# Patient Record
Sex: MALE | Race: WHITE | Employment: FULL TIME | URBAN - METROPOLITAN AREA
[De-identification: names, ages, dates, MRNs, and addresses within clinical notes are randomized per-mention and may not be internally consistent; named-entity substitution may affect disease eponyms.]

---

## 2019-04-08 ENCOUNTER — LAB ENCOUNTER (OUTPATIENT)
Dept: LAB | Age: 49
End: 2019-04-08
Attending: FAMILY MEDICINE
Payer: COMMERCIAL

## 2019-04-08 ENCOUNTER — OFFICE VISIT (OUTPATIENT)
Dept: FAMILY MEDICINE CLINIC | Facility: CLINIC | Age: 49
End: 2019-04-08
Payer: COMMERCIAL

## 2019-04-08 VITALS
HEART RATE: 49 BPM | SYSTOLIC BLOOD PRESSURE: 125 MMHG | BODY MASS INDEX: 23.65 KG/M2 | DIASTOLIC BLOOD PRESSURE: 77 MMHG | WEIGHT: 147.19 LBS | HEIGHT: 66 IN

## 2019-04-08 DIAGNOSIS — Z00.00 ROUTINE MEDICAL EXAM: Primary | ICD-10-CM

## 2019-04-08 DIAGNOSIS — Z00.00 ROUTINE MEDICAL EXAM: ICD-10-CM

## 2019-04-08 DIAGNOSIS — Z00.00 ROUTINE GENERAL MEDICAL EXAMINATION AT A HEALTH CARE FACILITY: Primary | ICD-10-CM

## 2019-04-08 PROCEDURE — 84443 ASSAY THYROID STIM HORMONE: CPT

## 2019-04-08 PROCEDURE — 82306 VITAMIN D 25 HYDROXY: CPT

## 2019-04-08 PROCEDURE — 93005 ELECTROCARDIOGRAM TRACING: CPT

## 2019-04-08 PROCEDURE — 99396 PREV VISIT EST AGE 40-64: CPT | Performed by: FAMILY MEDICINE

## 2019-04-08 PROCEDURE — 36415 COLL VENOUS BLD VENIPUNCTURE: CPT

## 2019-04-08 PROCEDURE — 85025 COMPLETE CBC W/AUTO DIFF WBC: CPT

## 2019-04-08 PROCEDURE — 80061 LIPID PANEL: CPT

## 2019-04-08 PROCEDURE — 80053 COMPREHEN METABOLIC PANEL: CPT

## 2019-04-08 PROCEDURE — 82607 VITAMIN B-12: CPT

## 2019-04-08 PROCEDURE — 93010 ELECTROCARDIOGRAM REPORT: CPT | Performed by: FAMILY MEDICINE

## 2019-04-08 NOTE — PROGRESS NOTES
Chuck Nguyễn is a 53year old male who presents for a complete physical exam.   HPI:   Reports is a runner - reports has always been. Feels good. Has good energy. Reports receiving Td within 10 years.     Wt Readings from Last 3 Encounters:  04/08/19 : 147 masses, HSM or tenderness  MUSCULOSKELETAL: back is not tender,FROM of the back  EXTREMITIES: no cyanosis, clubbing or edema  NEURO: Oriented times three,cranial nerves are intact,motor and sensory are grossly intact    ASSESSMENT AND PLAN:   Jose R Jones is

## 2019-04-10 ENCOUNTER — TELEPHONE (OUTPATIENT)
Dept: FAMILY MEDICINE CLINIC | Facility: CLINIC | Age: 49
End: 2019-04-10

## 2019-04-10 NOTE — PROGRESS NOTES
Tests are all normal. Labs look great. Normal glucose, kidney and liver function.  Normal cholesterol

## 2019-04-10 NOTE — TELEPHONE ENCOUNTER
----- Message from Sonido Antoine MD sent at 4/10/2019  9:11 AM CDT -----  Tests are all normal. Labs look great. Normal glucose, kidney and liver function.  Normal cholesterol

## 2019-04-11 NOTE — TELEPHONE ENCOUNTER
Pt requesting results for ekg    Also- pt states he is requesting a copy of blood results    He will be coming to  results today

## 2019-04-11 NOTE — TELEPHONE ENCOUNTER
Pt would like a call back with his blood test results. Please, call pt at [phone]. Pt is requesting a call back after 1:30 due to him being at work.

## 2019-04-15 NOTE — TELEPHONE ENCOUNTER
Patient notified of lab results. Patient verbalizes understanding of Dr. Moreno Osorio instructions.

## 2019-04-15 NOTE — TELEPHONE ENCOUNTER
Result Notes for EKG 12-LEAD     Notes recorded by Ever Farmer on 4/13/2019 at 10:56 AM CDT  Lab letter mailed to patient  ------    Notes recorded by Sebastian Loving MD on 4/11/2019 at 4:24 PM CDT  EKG normal other than the bradycardia which we dis

## 2020-10-02 NOTE — LETTER
5/3/2024          To Whom It May Concern:    Jak Duggan is currently under my medical care and may return to work at this time.    Please excuse Jak for 1 days.  He may return to work on Monday 5/6/24.  Activity is restricted as follows: light duty and no lifting over 20 lbs..    If you require additional information please contact our office.        Sincerely,    JOSSE Verma          Document generated by:  JOSSE Verma      2 seconds or less

## 2022-01-01 NOTE — LETTER
5/23/2024          To Whom It May Concern:    Jak Zhou is currently under my care and he may return to work  May 28th light duty:  no lifting over 15 lbs and no strenuous activity.  He may return to regular duty effective July 3     If you require additional information please contact our office.        Sincerely,         Torey Haas MD          Document generated by:  NW     Statement Selected

## 2022-09-03 PROBLEM — J30.1 SEASONAL ALLERGIC RHINITIS DUE TO POLLEN: Status: ACTIVE | Noted: 2022-09-03

## 2022-09-03 PROBLEM — Z23 IMMUNIZATION DUE: Status: ACTIVE | Noted: 2022-09-03

## 2022-09-03 PROBLEM — I83.90 VARICOSE VEINS: Status: ACTIVE | Noted: 2022-09-03

## 2022-09-03 PROBLEM — Z12.11 SCREENING FOR COLON CANCER: Status: ACTIVE | Noted: 2022-09-03

## 2022-09-03 PROBLEM — Z00.00 WELL ADULT EXAM: Status: ACTIVE | Noted: 2022-09-03

## 2022-09-03 PROBLEM — Z12.5 SCREENING FOR MALIGNANT NEOPLASM OF PROSTATE: Status: ACTIVE | Noted: 2022-09-03

## 2022-09-03 NOTE — ASSESSMENT & PLAN NOTE
Screening labs  Please aim to eat a diet high in fresh fruits and vegetables, lean protein sources, complex carbohydrates and limited processed and fast foods. Try to get at least 150 minutes of exercise per week-a combination of weight resistance and cardio is preferred.     Shingles vaccine today  Fit testing ordered  psa  Has covid vaccines x3

## 2022-09-03 NOTE — ASSESSMENT & PLAN NOTE
Pt will look to see if he can find the imaging that was done at [address]  If unable to locate imaging, ultrasound bilat lower legs  Refer vascular sx

## 2023-06-24 PROBLEM — R14.0 ABDOMINAL BLOATING: Status: ACTIVE | Noted: 2023-06-24

## 2023-06-24 PROBLEM — R68.81 EARLY SATIETY: Status: ACTIVE | Noted: 2023-06-24

## 2024-05-03 ENCOUNTER — OFFICE VISIT (OUTPATIENT)
Dept: FAMILY MEDICINE CLINIC | Facility: CLINIC | Age: 54
End: 2024-05-03

## 2024-05-03 VITALS
BODY MASS INDEX: 24.61 KG/M2 | HEART RATE: 64 BPM | WEIGHT: 156.81 LBS | TEMPERATURE: 98 F | OXYGEN SATURATION: 97 % | DIASTOLIC BLOOD PRESSURE: 66 MMHG | SYSTOLIC BLOOD PRESSURE: 115 MMHG | HEIGHT: 67 IN

## 2024-05-03 DIAGNOSIS — R19.09 INGUINAL BULGE: Primary | ICD-10-CM

## 2024-05-03 DIAGNOSIS — R10.32 LEFT GROIN PAIN: ICD-10-CM

## 2024-05-03 PROCEDURE — 99213 OFFICE O/P EST LOW 20 MIN: CPT

## 2024-05-03 NOTE — ASSESSMENT & PLAN NOTE
Resolved after patient self reduced per description of the event.  Red flags reviewed.  Inguinal US ordered.

## 2024-05-03 NOTE — ASSESSMENT & PLAN NOTE
Reported pain and bulge with sit ups.  Unable to appreciate on exam.  Patient works in warehouse with heavy lifting.   Placed on light duty, work note provided.  Pt to obtain groin US and eval for hernia. Will remove restriction if negative or refer to gen surgery if positive.   Provided with education  Reviewed red flags.  Follow up for physical

## 2024-05-03 NOTE — PROGRESS NOTES
Subjective:   Jak Duggan is a 53 year old male who presents for Stomach Pain (Possible hernia, I was doing sit ups in the gym and it hurted a lil and noticed the bump. I feel a bump left side abdominal on the crest side, past 3 days noticed it)   Patient is a pleasant 53-year-old male with past medical history consistent for allergic rhinitis and varicose veins.  Patient presents to office today for evaluation of pain in stomach, possible hernia.  Patient states he was at home doing sit ups on Tuesday, he does this on a regular basis. When he was doing them he noticed a bulge in left lower abdomen with a mild pain.Pain comes and goes, better when he pushes. He is very active and wants to see what this is. His job is physical, works in warehouse with labor and heavy boxes.           History reviewed. No pertinent past medical history.   History reviewed. No pertinent surgical history.     History/Other:    Chief Complaint Reviewed and Verified  Nursing Notes Reviewed and   Verified  Tobacco Reviewed  Allergies Reviewed  Medications Reviewed    Problem List Reviewed  Medical History Reviewed  Surgical History   Reviewed  Family History Reviewed  Social History Reviewed         Tobacco:  He has never smoked tobacco.    Current Outpatient Medications   Medication Sig Dispense Refill    pantoprazole 40 MG Oral Tab EC Take 1 tablet (40 mg total) by mouth every morning before breakfast. To help with stomach acid and stomach irritation/inflammation (Patient not taking: Reported on 5/3/2024) 90 tablet 1         Review of Systems:  Review of Systems   Constitutional: Negative.  Negative for activity change, chills and fever.   HENT: Negative.  Negative for congestion, ear pain, postnasal drip, sinus pain, sore throat and trouble swallowing.    Respiratory: Negative.  Negative for cough, shortness of breath and wheezing.    Cardiovascular: Negative.  Negative for chest pain and leg swelling.    Gastrointestinal:  Positive for abdominal pain. Negative for blood in stool, constipation and diarrhea.   Endocrine: Negative.    Genitourinary: Negative.  Negative for difficulty urinating, dysuria and flank pain.   Musculoskeletal: Negative.  Negative for arthralgias, back pain and neck stiffness.   Skin: Negative.  Negative for color change and rash.   Neurological: Negative.  Negative for dizziness and headaches.   Hematological:  Negative for adenopathy.         Objective:   /66 (BP Location: Left arm, Patient Position: Sitting, Cuff Size: adult)   Pulse 64   Temp 97.9 °F (36.6 °C)   Ht 5' 7\" (1.702 m)   Wt 156 lb 12.8 oz (71.1 kg)   SpO2 97%   BMI 24.56 kg/m²  Estimated body mass index is 24.56 kg/m² as calculated from the following:    Height as of this encounter: 5' 7\" (1.702 m).    Weight as of this encounter: 156 lb 12.8 oz (71.1 kg).  Physical Exam  Vitals and nursing note reviewed.   Constitutional:       Appearance: Normal appearance.   HENT:      Head: Normocephalic and atraumatic.      Right Ear: External ear normal.      Left Ear: External ear normal.   Cardiovascular:      Rate and Rhythm: Normal rate and regular rhythm.      Pulses: Normal pulses.      Heart sounds: Normal heart sounds.   Pulmonary:      Breath sounds: Normal breath sounds.   Abdominal:      General: Abdomen is flat. Bowel sounds are normal. There is no distension.      Palpations: Abdomen is soft. There is no mass.      Tenderness: There is no abdominal tenderness. There is no right CVA tenderness, left CVA tenderness, guarding or rebound.      Hernia: No hernia is present.      Comments: Reported bulge left inguinal hernia.  Unable to elicit bulge with increase intraabdominal pressure ( cough, sitting up)   Musculoskeletal:         General: No swelling or tenderness. Normal range of motion.   Skin:     General: Skin is warm and dry.   Neurological:      Mental Status: He is alert.           Assessment & Plan:   1.  Inguinal bulge (Primary)  Assessment & Plan:  Reported pain and bulge with sit ups.  Unable to appreciate on exam.  Patient works in warehouse with heavy lifting.   Placed on light duty, work note provided.  Pt to obtain groin US and eval for hernia. Will remove restriction if negative or refer to gen surgery if positive.   Provided with education  Reviewed red flags.  Follow up for physical   Orders:  -     US GROIN LEFT LIMITED SH(CPT=76882); Future; Expected date: 05/03/2024  2. Left groin pain  Assessment & Plan:  Resolved after patient self reduced per description of the event.  Red flags reviewed.  Inguinal US ordered.   Orders:  -     US GROIN LEFT LIMITED SH(CPT=76882); Future; Expected date: 05/03/2024        Return if symptoms worsen or fail to improve, for Annual physical.    JOSSE Verma, 5/3/2024, 8:31 AM

## 2024-05-06 ENCOUNTER — TELEPHONE (OUTPATIENT)
Dept: FAMILY MEDICINE CLINIC | Facility: CLINIC | Age: 54
End: 2024-05-06

## 2024-05-06 ENCOUNTER — HOSPITAL ENCOUNTER (OUTPATIENT)
Dept: ULTRASOUND IMAGING | Facility: HOSPITAL | Age: 54
Discharge: HOME | End: 2024-05-06
Payer: COMMERCIAL

## 2024-05-06 DIAGNOSIS — K40.90 NON-RECURRENT UNILATERAL INGUINAL HERNIA WITHOUT OBSTRUCTION OR GANGRENE: Primary | ICD-10-CM

## 2024-05-06 DIAGNOSIS — R19.09 INGUINAL BULGE: ICD-10-CM

## 2024-05-06 DIAGNOSIS — R10.32 LEFT GROIN PAIN: ICD-10-CM

## 2024-05-06 PROCEDURE — 76882 US LMTD JT/FCL EVL NVASC XTR: CPT

## 2024-05-06 NOTE — TELEPHONE ENCOUNTER
Note is waiting at front of office. It says no lifting greater than 20 lbs as it previously did. MA spoke with him earlier about referral to general surgery. thanks  
Patient called and is asking if he can get a return to work note to go back tomorrow, and he also wants to know what are his next steps for his hernia, he had his ultrasound done today, please advise.   For the back to work note patient can  at the office.   
Patient calling to state letter should state can return to work for light duty.   
Please advs Delio.   
Pt was contacted about this earlier.    Note at  with restrictions.    Pt made appt with Dr. Valdez  
Unknown if ever smoked

## 2024-05-06 NOTE — PROGRESS NOTES
Contacted pt and advised him of result and recommendations.    Provided pt with phone number for Dr. Valdez.    Pt asking for a letter to return to work tomorrow since he missed work due to his US today.    Please advise.

## 2024-05-06 NOTE — PROGRESS NOTES
1. Non-recurrent unilateral inguinal hernia without obstruction or gangrene  Paitent recently seen in office with bulge in groin. Sent for US, US reveals\" There is a direct left inguinal hernia measuring 4.9 x 1.3 x 2.2 cm.  The hernia measures 1.4 cm at its neck.  A loop of bowel is seen protruding into the neck of the hernia upon Valsalva maneuver.  The hernia changes size with and without Valsalva   maneuver and manual compression without evidence of incarceration.   Referral to gen surgery.  - Surgery Referral - In Network    Delio Elizalde, APRN

## 2024-05-14 ENCOUNTER — OFFICE VISIT (OUTPATIENT)
Dept: SURGERY | Facility: CLINIC | Age: 54
End: 2024-05-14

## 2024-05-14 VITALS — HEIGHT: 67 IN | BODY MASS INDEX: 24.48 KG/M2 | WEIGHT: 156 LBS

## 2024-05-14 DIAGNOSIS — K40.90 NON-RECURRENT UNILATERAL INGUINAL HERNIA WITHOUT OBSTRUCTION OR GANGRENE: Primary | ICD-10-CM

## 2024-05-14 PROCEDURE — 99204 OFFICE O/P NEW MOD 45 MIN: CPT | Performed by: SURGERY

## 2024-05-15 NOTE — H&P
HPI:    Patient ID: Jak Zhou is a 53 year old male presenting with   Chief Complaint   Patient presents with    Consult     Hernia, denies any pain    .    Consult        History reviewed. No pertinent past medical history.  History reviewed. No pertinent surgical history.  Family History   Problem Relation Age of Onset    Breast Cancer Mother     Diabetes Paternal Grandmother     Breast Cancer Paternal Grandmother     Diabetes Paternal Grandfather      Social History     Socioeconomic History    Marital status:      Spouse name: Not on file    Number of children: Not on file    Years of education: Not on file    Highest education level: Not on file   Occupational History    Not on file   Tobacco Use    Smoking status: Never     Passive exposure: Never    Smokeless tobacco: Never   Vaping Use    Vaping status: Never Used   Substance and Sexual Activity    Alcohol use: Never    Drug use: Never    Sexual activity: Not on file   Other Topics Concern    Not on file   Social History Narrative    Not on file     Social Determinants of Health     Financial Resource Strain: Not on file   Food Insecurity: Not on file   Transportation Needs: Not on file   Physical Activity: Not on file   Stress: Not on file   Social Connections: Not on file   Housing Stability: Not on file       Review of Systems   Constitutional: Negative.    HENT: Negative.     Eyes: Negative.    Respiratory: Negative.     Cardiovascular: Negative.    Gastrointestinal: Negative.         Left groin bulge and discomfort   Endocrine: Negative.    Genitourinary: Negative.    Musculoskeletal: Negative.    Skin: Negative.    Allergic/Immunologic: Negative.    Neurological: Negative.    Hematological:  Does not bruise/bleed easily.   Psychiatric/Behavioral: Negative.             Current Outpatient Medications   Medication Sig Dispense Refill    pantoprazole 40 MG Oral Tab EC Take 1 tablet (40 mg total) by mouth every morning before breakfast. To help with  stomach acid and stomach irritation/inflammation (Patient not taking: Reported on 5/3/2024) 90 tablet 1       Allergies:No Known Allergies   PHYSICAL EXAM:   Ht 5' 7\" (1.702 m)   Wt 156 lb (70.8 kg)   BMI 24.43 kg/m²   Physical Exam  Vitals reviewed.   Constitutional:       Appearance: Normal appearance. He is well-developed.   HENT:      Head: Normocephalic and atraumatic.   Cardiovascular:      Rate and Rhythm: Normal rate and regular rhythm.   Pulmonary:      Effort: Pulmonary effort is normal.      Breath sounds: Normal breath sounds.   Abdominal:      General: There is no distension.      Palpations: Abdomen is soft. There is no mass.      Tenderness: There is no abdominal tenderness. There is no guarding or rebound.      Hernia: A hernia is present.          Comments: Small reducible   Musculoskeletal:         General: Normal range of motion.      Cervical back: Normal range of motion and neck supple.   Skin:     General: Skin is warm and dry.   Neurological:      Mental Status: He is alert and oriented to person, place, and time.   Psychiatric:         Speech: Speech normal.         Behavior: Behavior normal.                 ASSESSMENT/PLAN:   Diagnoses and all orders for this visit:    Non-recurrent unilateral inguinal hernia without obstruction or gangrene    Other orders  -     Surgical Case Request; Future      Plan for an open LIH repair with mesh.  LMA ok.       Torey Haas MD  5/15/2024

## 2024-05-20 ENCOUNTER — TELEPHONE (OUTPATIENT)
Dept: SURGERY | Facility: CLINIC | Age: 54
End: 2024-05-20

## 2024-05-20 NOTE — TELEPHONE ENCOUNTER
Patient's daughter returning missed call about information for patient's 5/22 surgery. Please call.

## 2024-05-20 NOTE — DISCHARGE INSTRUCTIONS
Home Care Instructions    Open ventral/umbilical/inguinal/incisional hernia repair      1. You have an incision with absorbable sutures underneath the skin so no suture removal are needed.     2. You can shower the day after surgery.  The incision can get wet with water and soap.  Just pat your incision dry after showering.  Avoid soaking in a bath tub for one week.  Avoid heavy lifting (greater than 10 lbs or anything heavier than a gallon of milk) for six weeks after surgery.    3. Be up and around when you are home.  The more you walk, the faster your recovery will be.    4. You may have an abdominal binder (medical girdle).  Wear it when you are up and moving around.  The bottom of the binder should cover a little of your hip bones to provide support to your lower abdomen.  Avoid wearing the binder too high as it may make your discomfort worse.    5. Take pain medications around the clock for the first few days after surgery regardless if you have pain or not.  The pain medications take about 30 minutes to work so if you wait until you experience pain, then you might be uncomfortable during those 30 minutes.    6.  A well known side effect of pain medication is constipation.  It is the number 1, 2, and 3 reason why patients call me after surgery.  Adequate hydration and stool softeners are ways to minimize the risk of constipation after surgery.  Drink a lot of fluid when you are at home.  Check your urine color.  If it's dark, then you are dehydrated and need to drink more water.  Take as many stool softeners (morning, noon, evening) as you need to have about one bowel movement a day.  Don't let four or five days go by without a bowel movement.  If that occurs, then you might need a rectal suppository or an enema to treat the constipation.    7.  You may drive when you are no longer taking prescription pain medications with narcotics.  If your degree of discomfort is minimal, extra strength tylenol alternating  with ibuprofen could be used as necessary.    8. Please contact the office ([phone]) to schedule a telephone visit approximately two weeks after surgery.  At that time, if there are any issues, then we will schedule an in person clinic visit.    9. Signs and symptoms of post-operative problems include abdominal pain associated with nausea and/or vomiting, fever, chills, excessive drainage or pain at the incision sites, leg swelling/pain, or chest pain. Contact our office immediately if these signs or symptoms occur.    10. If you have any problems or questions please contact me at any time day or night.  My cell phone number is [phone].       HOME INSTRUCTIONS  [facility] HOME CARE INSTRUCTIONS: POST-OP ANESTHESIA  The medication that you received for sedation or general anesthesia can last up to 24 hours. Your judgment and reflexes may be altered, even if you feel like your normal self.      We Recommend:   Do not drive any motor vehicle or bicycle   Avoid mowing the lawn, playing sports, or working with power tools/applicances (power saws, electric knives or mixers)   That you have someone stay with you on your first night home   Do not drink alcohol or take sleeping pills or tranquilizers   Do not sign legal documents within 24 hours of your procedure   If you had a nerve block for your surgery, take extra care not to put any pressure on your arm or hand for 24 hours    It is normal:  For you to have a sore throat if you had a breathing tube during surgery (while you were asleep!). The sore throat should get better within 48 hours. You can gargle with warm salt water (1/2 tsp in 4 oz warm water) or use a throat lozenge for comfort  To feel muscle aches or soreness especially in the abdomen, chest or neck. The achy feeling should go away in the next 24 hours  To feel weak, sleepy or \"wiped out\". Your should start feeling better in the next 24 hours.   To experience mild discomforts such as sore lip or  tongue, headache, cramps, gas pains or a bloated feeling in your abdomen.   To experience mild back pain or soreness for a day or two if you had spinal or epidural anesthesia.   If you had laparoscopic surgery, to feel shoulder pain or discomfort on the day of surgery.   For some patients to have nausea after surgery/anesthesia    If you feel nausea or experience vomiting:   Try to move around less.   Eat less than usual or drink only liquids until the next morning   Nausea should resolve in about 24 hours    If you have a problem when you are at home:    Call your surgeons office   Discharge Instructions: After Your Surgery  You’ve just had surgery. During surgery, you were given medicine called anesthesia to keep you relaxed and free of pain. After surgery, you may have some pain or nausea. This is common. Here are some tips for feeling better and getting well after surgery.   Going home  Your healthcare provider will show you how to take care of yourself when you go home. They'll also answer your questions. Have an adult family member or friend drive you home. For the first 24 hours after your surgery:   Don't drive or use heavy equipment.  Don't make important decisions or sign legal papers.  Take medicines as directed.  Don't drink alcohol.  Have someone stay with you, if needed. They can watch for problems and help keep you safe.  Be sure to go to all follow-up visits with your healthcare provider. And rest after your surgery for as long as your provider tells you to.   Coping with pain  If you have pain after surgery, pain medicine will help you feel better. Take it as directed, before pain becomes severe. Also, ask your healthcare provider or pharmacist about other ways to control pain. This might be with heat, ice, or relaxation. And follow any other instructions your surgeon or nurse gives you.      Stay on schedule with your medicine.     Tips for taking pain medicine  To get the best relief possible,  remember these points:   Pain medicines can upset your stomach. Taking them with a little food may help.  Most pain relievers taken by mouth need at least 20 to 30 minutes to start to work.  Don't wait till your pain becomes severe before you take your medicine. Try to time your medicine so that you can take it before starting an activity. This might be before you get dressed, go for a walk, or sit down for dinner.  Constipation is a common side effect of some pain medicines. Call your healthcare provider before taking any medicines such as laxatives or stool softeners to help ease constipation. Also ask if you should skip any foods. Drinking lots of fluids and eating foods such as fruits and vegetables that are high in fiber can also help. Remember, don't take laxatives unless your surgeon has prescribed them.  Drinking alcohol and taking pain medicine can cause dizziness and slow your breathing. It can even be deadly. Don't drink alcohol while taking pain medicine.  Pain medicine can make you react more slowly to things. Don't drive or run machinery while taking pain medicine.  Your healthcare provider may tell you to take acetaminophen to help ease your pain. Ask them how much you're supposed to take each day. Acetaminophen or other pain relievers may interact with your prescription medicines or other over-the-counter (OTC) medicines. Some prescription medicines have acetaminophen and other ingredients in them. Using both prescription and OTC acetaminophen for pain can cause you to accidentally overdose. Read the labels on your OTC medicines with care. This will help you to clearly know the list of ingredients, how much to take, and any warnings. It may also help you not take too much acetaminophen. If you have questions or don't understand the information, ask your pharmacist or healthcare provider to explain it to you before you take the OTC medicine.   Managing nausea  Some people have an upset stomach  (nausea) after surgery. This is often because of anesthesia, pain, or pain medicine, less movement of food in the stomach, or the stress of surgery. These tips will help you handle nausea and eat healthy foods as you get better. If you were on a special food plan before surgery, ask your healthcare provider if you should follow it while you get better. Check with your provider on how your eating should progress. It may depend on the surgery you had. These general tips may help:   Don't push yourself to eat. Your body will tell you when to eat and how much.  Start off with clear liquids and soup. They're easier to digest.  Next try semi-solid foods as you feel ready. These include mashed potatoes, applesauce, and gelatin.  Slowly move to solid foods. Don’t eat fatty, rich, or spicy foods at first.  Don't force yourself to have 3 large meals a day. Instead eat smaller amounts more often.  Take pain medicines with a small amount of solid food, such as crackers or toast. This helps prevent nausea.  When to call your healthcare provider  Call your healthcare provider right away if you have any of these:   You still have too much pain, or the pain gets worse, after taking the medicine. The medicine may not be strong enough. Or there may be a complication from the surgery.  You feel too sleepy, dizzy, or groggy. The medicine may be too strong.  Side effects such as nausea or vomiting. Your healthcare provider may advise taking other medicines to .  Skin changes such as rash, itching, or hives. This may mean you have an allergic reaction. Your provider may advise taking other medicines.  The incision looks different (for instance, part of it opens up).  Bleeding or fluid leaking from the incision site, and weren't told to expect that.  Fever of 100.4°F (38°C) or higher, or as directed by your provider.  Call 911  Call 911 right away if you have:   Trouble breathing  Facial swelling    If you have obstructive sleep apnea    You were given anesthesia medicine during surgery to keep you comfortable and free of pain. After surgery, you may have more apnea spells because of this medicine and other medicines you were given. The spells may last longer than normal.    At home:  Keep using the continuous positive airway pressure (CPAP) device when you sleep. Unless your healthcare provider tells you not to, use it when you sleep, day or night. CPAP is a common device used to treat obstructive sleep apnea.  Talk with your provider before taking any pain medicine, muscle relaxants, or sedatives. Your provider will tell you about the possible dangers of taking these medicines.  Contact your provider if your sleeping changes a lot even when taking medicines as directed.  [employer] last reviewed this educational content on 10/1/2021  © 9700-1380 [employer]. All rights reserved. This information is not intended as a substitute for professional medical care. Always follow your healthcare professional's instructions.

## 2024-05-22 ENCOUNTER — HOSPITAL ENCOUNTER (OUTPATIENT)
Facility: HOSPITAL | Age: 54
Setting detail: HOSPITAL OUTPATIENT SURGERY
Discharge: HOME | End: 2024-05-22
Attending: SURGERY | Admitting: SURGERY

## 2024-05-22 ENCOUNTER — ANESTHESIA EVENT (OUTPATIENT)
Dept: SURGERY | Facility: HOSPITAL | Age: 54
End: 2024-05-22

## 2024-05-22 ENCOUNTER — ANESTHESIA (OUTPATIENT)
Dept: SURGERY | Facility: HOSPITAL | Age: 54
End: 2024-05-22

## 2024-05-22 VITALS
WEIGHT: 153 LBS | DIASTOLIC BLOOD PRESSURE: 64 MMHG | TEMPERATURE: 97 F | HEIGHT: 67 IN | SYSTOLIC BLOOD PRESSURE: 111 MMHG | HEART RATE: 53 BPM | RESPIRATION RATE: 12 BRPM | BODY MASS INDEX: 24.01 KG/M2 | OXYGEN SATURATION: 98 %

## 2024-05-22 DIAGNOSIS — K40.90 NON-RECURRENT UNILATERAL INGUINAL HERNIA WITHOUT OBSTRUCTION OR GANGRENE: Primary | ICD-10-CM

## 2024-05-22 DIAGNOSIS — K40.30 INGUINAL HERNIA OF LEFT SIDE WITH OBSTRUCTION AND WITHOUT GANGRENE: ICD-10-CM

## 2024-05-22 PROCEDURE — 0YU60JZ SUPPLEMENT LEFT INGUINAL REGION WITH SYNTHETIC SUBSTITUTE, OPEN APPROACH: ICD-10-PCS | Performed by: SURGERY

## 2024-05-22 PROCEDURE — 49505 PRP I/HERN INIT REDUC >5 YR: CPT | Performed by: SURGERY

## 2024-05-22 DEVICE — BARD MESH
Type: IMPLANTABLE DEVICE | Site: GROIN | Status: FUNCTIONAL
Brand: BARD MESH

## 2024-05-22 RX ORDER — GLYCOPYRROLATE 0.2 MG/ML
INJECTION, SOLUTION INTRAMUSCULAR; INTRAVENOUS AS NEEDED
Status: DISCONTINUED | OUTPATIENT
Start: 2024-05-22 | End: 2024-05-22 | Stop reason: SURG

## 2024-05-22 RX ORDER — HYDROCODONE BITARTRATE AND ACETAMINOPHEN 5; 325 MG/1; MG/1
1 TABLET ORAL EVERY 6 HOURS PRN
Qty: 30 TABLET | Refills: 0 | Status: SHIPPED | OUTPATIENT
Start: 2024-05-22

## 2024-05-22 RX ORDER — HYDROMORPHONE HYDROCHLORIDE 1 MG/ML
0.6 INJECTION, SOLUTION INTRAMUSCULAR; INTRAVENOUS; SUBCUTANEOUS EVERY 5 MIN PRN
Status: DISCONTINUED | OUTPATIENT
Start: 2024-05-22 | End: 2024-05-22

## 2024-05-22 RX ORDER — ONDANSETRON 2 MG/ML
INJECTION INTRAMUSCULAR; INTRAVENOUS AS NEEDED
Status: DISCONTINUED | OUTPATIENT
Start: 2024-05-22 | End: 2024-05-22 | Stop reason: SURG

## 2024-05-22 RX ORDER — EPHEDRINE SULFATE 50 MG/ML
INJECTION INTRAVENOUS AS NEEDED
Status: DISCONTINUED | OUTPATIENT
Start: 2024-05-22 | End: 2024-05-22 | Stop reason: SURG

## 2024-05-22 RX ORDER — POLYETHYLENE GLYCOL 3350 17 G/17G
17 POWDER, FOR SOLUTION ORAL DAILY
Qty: 14 PACKET | Refills: 0 | Status: SHIPPED | OUTPATIENT
Start: 2024-05-22 | End: 2024-06-05

## 2024-05-22 RX ORDER — NALOXONE HYDROCHLORIDE 0.4 MG/ML
80 INJECTION, SOLUTION INTRAMUSCULAR; INTRAVENOUS; SUBCUTANEOUS AS NEEDED
Status: DISCONTINUED | OUTPATIENT
Start: 2024-05-22 | End: 2024-05-22

## 2024-05-22 RX ORDER — HYDROCODONE BITARTRATE AND ACETAMINOPHEN 5; 325 MG/1; MG/1
1 TABLET ORAL ONCE
Status: COMPLETED | OUTPATIENT
Start: 2024-05-22 | End: 2024-05-22

## 2024-05-22 RX ORDER — BUPIVACAINE HYDROCHLORIDE 5 MG/ML
INJECTION, SOLUTION EPIDURAL; INTRACAUDAL AS NEEDED
Status: DISCONTINUED | OUTPATIENT
Start: 2024-05-22 | End: 2024-05-22 | Stop reason: HOSPADM

## 2024-05-22 RX ORDER — SODIUM CHLORIDE, SODIUM LACTATE, POTASSIUM CHLORIDE, CALCIUM CHLORIDE 600; 310; 30; 20 MG/100ML; MG/100ML; MG/100ML; MG/100ML
INJECTION, SOLUTION INTRAVENOUS CONTINUOUS
Status: DISCONTINUED | OUTPATIENT
Start: 2024-05-22 | End: 2024-05-22

## 2024-05-22 RX ORDER — LIDOCAINE HYDROCHLORIDE 10 MG/ML
INJECTION, SOLUTION EPIDURAL; INFILTRATION; INTRACAUDAL; PERINEURAL AS NEEDED
Status: DISCONTINUED | OUTPATIENT
Start: 2024-05-22 | End: 2024-05-22 | Stop reason: SURG

## 2024-05-22 RX ORDER — DEXAMETHASONE SODIUM PHOSPHATE 4 MG/ML
VIAL (ML) INJECTION AS NEEDED
Status: DISCONTINUED | OUTPATIENT
Start: 2024-05-22 | End: 2024-05-22 | Stop reason: SURG

## 2024-05-22 RX ORDER — HYDROMORPHONE HYDROCHLORIDE 1 MG/ML
0.2 INJECTION, SOLUTION INTRAMUSCULAR; INTRAVENOUS; SUBCUTANEOUS EVERY 5 MIN PRN
Status: DISCONTINUED | OUTPATIENT
Start: 2024-05-22 | End: 2024-05-22

## 2024-05-22 RX ORDER — MORPHINE SULFATE 4 MG/ML
2 INJECTION, SOLUTION INTRAMUSCULAR; INTRAVENOUS EVERY 10 MIN PRN
Status: DISCONTINUED | OUTPATIENT
Start: 2024-05-22 | End: 2024-05-22

## 2024-05-22 RX ORDER — MORPHINE SULFATE 10 MG/ML
6 INJECTION, SOLUTION INTRAMUSCULAR; INTRAVENOUS EVERY 10 MIN PRN
Status: DISCONTINUED | OUTPATIENT
Start: 2024-05-22 | End: 2024-05-22

## 2024-05-22 RX ORDER — MORPHINE SULFATE 4 MG/ML
4 INJECTION, SOLUTION INTRAMUSCULAR; INTRAVENOUS EVERY 10 MIN PRN
Status: DISCONTINUED | OUTPATIENT
Start: 2024-05-22 | End: 2024-05-22

## 2024-05-22 RX ORDER — MIDAZOLAM HYDROCHLORIDE 1 MG/ML
INJECTION INTRAMUSCULAR; INTRAVENOUS AS NEEDED
Status: DISCONTINUED | OUTPATIENT
Start: 2024-05-22 | End: 2024-05-22 | Stop reason: SURG

## 2024-05-22 RX ORDER — MAGNESIUM HYDROXIDE 1200 MG/15ML
LIQUID ORAL CONTINUOUS PRN
Status: DISCONTINUED | OUTPATIENT
Start: 2024-05-22 | End: 2024-05-22

## 2024-05-22 RX ORDER — ACETAMINOPHEN 500 MG
1000 TABLET ORAL ONCE
Status: COMPLETED | OUTPATIENT
Start: 2024-05-22 | End: 2024-05-22

## 2024-05-22 RX ORDER — HYDROMORPHONE HYDROCHLORIDE 1 MG/ML
0.4 INJECTION, SOLUTION INTRAMUSCULAR; INTRAVENOUS; SUBCUTANEOUS EVERY 5 MIN PRN
Status: DISCONTINUED | OUTPATIENT
Start: 2024-05-22 | End: 2024-05-22

## 2024-05-22 RX ORDER — KETOROLAC TROMETHAMINE 30 MG/ML
INJECTION, SOLUTION INTRAMUSCULAR; INTRAVENOUS AS NEEDED
Status: DISCONTINUED | OUTPATIENT
Start: 2024-05-22 | End: 2024-05-22 | Stop reason: SURG

## 2024-05-22 RX ADMIN — LIDOCAINE HYDROCHLORIDE 50 MG: 10 INJECTION, SOLUTION EPIDURAL; INFILTRATION; INTRACAUDAL; PERINEURAL at 12:18:00

## 2024-05-22 RX ADMIN — EPHEDRINE SULFATE 5 MG: 50 INJECTION INTRAVENOUS at 12:41:00

## 2024-05-22 RX ADMIN — GLYCOPYRROLATE 0.1 MG: 0.2 INJECTION, SOLUTION INTRAMUSCULAR; INTRAVENOUS at 12:18:00

## 2024-05-22 RX ADMIN — ONDANSETRON 4 MG: 2 INJECTION INTRAMUSCULAR; INTRAVENOUS at 13:03:00

## 2024-05-22 RX ADMIN — GLYCOPYRROLATE 0.1 MG: 0.2 INJECTION, SOLUTION INTRAMUSCULAR; INTRAVENOUS at 12:14:00

## 2024-05-22 RX ADMIN — EPHEDRINE SULFATE 5 MG: 50 INJECTION INTRAVENOUS at 12:45:00

## 2024-05-22 RX ADMIN — EPHEDRINE SULFATE 5 MG: 50 INJECTION INTRAVENOUS at 13:02:00

## 2024-05-22 RX ADMIN — MIDAZOLAM HYDROCHLORIDE 2 MG: 1 INJECTION INTRAMUSCULAR; INTRAVENOUS at 12:14:00

## 2024-05-22 RX ADMIN — SODIUM CHLORIDE, SODIUM LACTATE, POTASSIUM CHLORIDE, CALCIUM CHLORIDE: 600; 310; 30; 20 INJECTION, SOLUTION INTRAVENOUS at 12:14:00

## 2024-05-22 RX ADMIN — KETOROLAC TROMETHAMINE 30 MG: 30 INJECTION, SOLUTION INTRAMUSCULAR; INTRAVENOUS at 13:03:00

## 2024-05-22 RX ADMIN — SODIUM CHLORIDE, SODIUM LACTATE, POTASSIUM CHLORIDE, CALCIUM CHLORIDE: 600; 310; 30; 20 INJECTION, SOLUTION INTRAVENOUS at 13:03:00

## 2024-05-22 RX ADMIN — DEXAMETHASONE SODIUM PHOSPHATE 4 MG: 4 MG/ML VIAL (ML) INJECTION at 12:25:00

## 2024-05-22 NOTE — ANESTHESIA PREPROCEDURE EVALUATION
Anesthesia PreOp Note    HPI:     Jak Zhou is a 53 year old male who presents for preoperative consultation requested by: Torey Haas MD    Date of Surgery: 5/22/2024    Procedure(s):  Hernia inguinal repair adult, left with mesh  Indication: Inguinal hernia of left side with obstruction and without gangrene [K40.30]    Relevant Problems   No relevant active problems       NPO:  Last Liquid Consumption Date: 05/21/24  Last Liquid Consumption Time: 2000  Last Solid Consumption Date: 05/21/24  Last Solid Consumption Time: 2000  Last Liquid Consumption Date: 05/21/24          History Review:  Patient Active Problem List    Diagnosis Date Noted    Non-recurrent unilateral inguinal hernia without obstruction or gangrene 05/06/2024    Inguinal bulge 05/03/2024    Left groin pain 05/03/2024    Early satiety 06/24/2023    Abdominal bloating 06/24/2023    Well adult exam 09/03/2022    Varicose veins 09/03/2022    Screening for malignant neoplasm of prostate 09/03/2022    Screening for colon cancer 09/03/2022    Immunization due 09/03/2022    Seasonal allergic rhinitis due to pollen 09/03/2022       Past Medical History:    Esophageal reflux    Visual impairment    Glasses       Past Surgical History:   Procedure Laterality Date    Nail removal      Ingrown toenail, many years ago       Medications Prior to Admission   Medication Sig Dispense Refill Last Dose    pantoprazole 40 MG Oral Tab EC Take 1 tablet (40 mg total) by mouth every morning before breakfast. To help with stomach acid and stomach irritation/inflammation 90 tablet 1 Unknown     Current Facility-Administered Medications Ordered in Epic   Medication Dose Route Frequency Provider Last Rate Last Admin    lactated ringers infusion   Intravenous Continuous Torey Haas MD 20 mL/hr at 05/22/24 0946 New Bag at 05/22/24 0946    ceFAZolin (Ancef) 2g in 10mL IV syringe premix  2 g Intravenous Once Torey Haas MD         No current [facility]-ordered outpatient medications on  file.       No Known Allergies    Family History   Problem Relation Age of Onset    Breast Cancer Mother     Diabetes Paternal Grandmother     Breast Cancer Paternal Grandmother     Diabetes Paternal Grandfather      Social History     Socioeconomic History    Marital status:    Tobacco Use    Smoking status: Never     Passive exposure: Never    Smokeless tobacco: Never   Vaping Use    Vaping status: Never Used   Substance and Sexual Activity    Alcohol use: Never    Drug use: Never       Available pre-op labs reviewed.             Vital Signs:  Body mass index is 23.96 kg/m².   height is 1.702 m (5' 7\") and weight is 69.4 kg (153 lb). His oral temperature is 97.7 °F (36.5 °C). His blood pressure is 129/73 and his pulse is 54. His respiration is 16 and oxygen saturation is 99%.   Vitals:    05/18/24 1132 05/22/24 0939   BP:  129/73   Pulse:  54   Resp:  16   Temp:  97.7 °F (36.5 °C)   TempSrc:  Oral   SpO2:  99%   Weight: 70.8 kg (156 lb) 69.4 kg (153 lb)   Height: 1.702 m (5' 7\") 1.702 m (5' 7\")        Anesthesia Evaluation     Patient summary reviewed and Nursing notes reviewed    Airway   Mallampati: II  Dental - Dentition appears grossly intact     Pulmonary - negative ROS and normal exam   Cardiovascular - negative ROS and normal exam  Exercise tolerance: good    Rhythm: regular    Neuro/Psych - negative ROS     GI/Hepatic/Renal    (+) GERD well controlled    Endo/Other - negative ROS   Abdominal                  Anesthesia Plan:   ASA:  1  Plan:   General  Airway:  LMA  Post-op Pain Management: IV analgesics  Informed Consent Plan and Risks Discussed With:  Patient, spouse and child/children      I have informed Jak Zhou and/or legal guardian or family member of the nature of the anesthetic plan, benefits, risks including possible dental damage if relevant, major complications, and any alternative forms of anesthetic management.   All of the patient's questions were answered to the best of my ability.  The patient desires the anesthetic management as planned.  DENI BUTLER MD  5/22/2024 9:49 AM  Present on Admission:  **None**

## 2024-05-22 NOTE — OPERATIVE REPORT
[facility]  part of [facility]     Operative Report    Patient Name:  Jak Zhou  MR:  [id number]  :  1970  DOS:  24    Preop Dx:  Inguinal hernia of left side with obstruction and without gangrene [K40.30]  Postop Dx:  Inguinal hernia of left side with obstruction and without gangrene [K40.30]  Procedure:  Hernia inguinal repair adult, left with mesh  Surgeon:  Torey Haas MD  Surgical Assistant.: Dave Carlos CSA, Medina Munguia MD  EBL: Blood Output: 5 mL (2024  1:04 PM)    Complication:  None    INDICATION:  Pt is a 53 year old male who with a symptomatic Inguinal hernia of left side with obstruction and without gangrene [K40.30] who is scheduled for a Hernia inguinal repair adult, left with mesh.    CONSENT:  An informed consent discussion was held with the patient regarding the nature of inguinal hernias, the treatment options, expected outcomes, risks and complications.  These risks include but are not limited to bleeding, wound infection, mesh infection, bowel injury, injury to the spermatic cord structures including the vas deferens, chronic groin pain due to nerve injury or entrapment, and hernia recurrence.  The patient expressed understanding and agreed to proceed with an open inguinal hernia repair with mesh.      TECHNIQUE:  The patient was taken to the operating room and placed in supine position.  LMA was administered and the safety check was performed.  IV antibiotic was given and the abdomen was prepped and draped in standard fashion.  An Ioban drape was placed on the lower abdomen.  SCD per placed on the calves for DVT prophylaxis.      A solution of 0.5% marcaine was used to infiltrate the skin and subcutaneous tissue overlying the left inguinal canal.  An oblique incision was made over the left inguinal canal and the external oblique was identified.  The external ring was opened and the cord structures were mobilized from the pubic tubercle and  secured using a penrose drain.  Care was taken not to injure the ilioinguinal nerve during the dissection.  The cord was dissected to identify the hernia sac as well as the cord lipoma.  A high dissection and reduction of the hernia sac was performed.  The inguinal floor was closed using a 3-0 PDS.  We proceeded to fix the inguinal hernia using the Raul technique.  A piece of polypropylene mesh was cut to an appropriate size and sutured to the pubic tubercle using an interrupted 2-0 prolene.  Additional interrupted 2-0 prolenes were used to secure the mesh to the conjoined tendon medially and the shelving edge of the inguinal ligament laterally.  The leaflets of the mesh was wrapped around the cord to re-create the internal ring and tucked under the external oblique laterally.  The mesh repair was adequate without tension.  We irrigated the wound with saline and hemostasis was obtained.  There were no injury to the Vas deferens or the other cord structures during our dissection.  The external oblique was closed using 2-0 vicryl to re-create the external ring.  The subcutaneous tissue and skin were closed using 3-0 and 4-0 vicryl.      The patient was stable throughout the procedure.  All instrument and sponge counts were correct at the end of the case.  I was present during the critical portions of the procedure.    Torey Haas MD

## 2024-05-22 NOTE — ANESTHESIA POSTPROCEDURE EVALUATION
Patient: Jak Zhou    Procedure Summary       Date: 05/22/24 Room / Location: [facility] MAIN OR  / [facility] MAIN OR    Anesthesia Start: 1213 Anesthesia Stop: 1327    Procedure: Hernia inguinal repair adult, left with mesh (Left: Groin) Diagnosis:       Inguinal hernia of left side with obstruction and without gangrene      (Inguinal hernia of left side with obstruction and without gangrene [K40.30])    Surgeons: Torey Haas MD Anesthesiologist: Baljinder Babcock MD    Anesthesia Type: general ASA Status: 1            Anesthesia Type: general    Vitals Value Taken Time   /69 05/22/24 1326   Temp 97.2 °F (36.2 °C) 05/22/24 1326   Pulse 52 05/22/24 1327   Resp 16 05/22/24 1327   SpO2 99 % 05/22/24 1327   Vitals shown include unfiled device data.    EM AN Post Evaluation:   Patient Evaluated in PACU  Patient Participation: complete - patient participated  Level of Consciousness: awake  Pain Score: 0  Pain Management: adequate  Airway Patency:patent  Dental exam unchanged from preop  Yes    Nausea/Vomiting: none  Cardiovascular Status: acceptable  Respiratory Status: acceptable and nasal cannula  Postoperative Hydration acceptable    KOKO ZHANG CRNA  5/22/2024 1:27 PM

## 2024-05-22 NOTE — ANESTHESIA PROCEDURE NOTES
Airway  Date/Time: 5/22/2024 12:22 PM  Urgency: Elective      General Information and Staff    Patient location during procedure: OR  Anesthesiologist: Baljinder Babcock MD  Resident/CRNA: Selina Morillo CRNA  Performed: CRNA   Performed by: Selina Morillo CRNA  Authorized by: Baljinder Babcock MD      Indications and Patient Condition  Indications for airway management: anesthesia  Sedation level: deep  Preoxygenated: yes  Patient position: sniffing  Mask difficulty assessment: 1 - vent by mask    Final Airway Details  Final airway type: supraglottic airway      Successful airway: classic  Size 4       Number of attempts at approach: 1    Additional Comments  Dental exam unchanged from pre op  Soft guaze bite block between molars

## 2024-05-22 NOTE — INTERVAL H&P NOTE
Pre-op Diagnosis: Inguinal hernia of left side with obstruction and without gangrene [K40.30]    The above referenced H&P was reviewed by Torey Haas MD on 5/22/2024, the patient was examined and no significant changes have occurred in the patient's condition since the H&P was performed.  I discussed with the patient and/or legal representative the potential benefits, risks and side effects of this procedure; the likelihood of the patient achieving goals; and potential problems that might occur during recuperation.  I discussed reasonable alternatives to the procedure, including risks, benefits and side effects related to the alternatives and risks related to not receiving this procedure.  We will proceed with procedure as planned.

## 2024-05-23 ENCOUNTER — TELEPHONE (OUTPATIENT)
Dept: SURGERY | Facility: CLINIC | Age: 54
End: 2024-05-23

## 2024-05-24 ENCOUNTER — TELEPHONE (OUTPATIENT)
Dept: SURGERY | Facility: CLINIC | Age: 54
End: 2024-05-24

## 2024-05-24 NOTE — TELEPHONE ENCOUNTER
Per patient he is requesting a letter to return to work 6/3 with light duty for 2 weeks, then full duty 6/17. Please advise

## 2024-05-28 ENCOUNTER — TELEPHONE (OUTPATIENT)
Dept: SURGERY | Facility: CLINIC | Age: 54
End: 2024-05-28

## 2024-05-28 NOTE — TELEPHONE ENCOUNTER
Patient states that he needs to get an excuse letter for work to extend the return date as he needs more time to heal. Patient is requesting to go back to work on Monday 6/24/2024. Patient states that he does have a post op appointment scheduled for 6/4/2024. [facility] [place]

## 2024-05-29 ENCOUNTER — TELEPHONE (OUTPATIENT)
Dept: SURGERY | Facility: CLINIC | Age: 54
End: 2024-05-29

## 2024-05-29 NOTE — TELEPHONE ENCOUNTER
Patient has questions regarding work note   Per patient he needs work note soon,  I tried to reach out to harvey but was told she was gone for the day.Please give patient a call back in regarding his work note.

## 2024-05-29 NOTE — TELEPHONE ENCOUNTER
5- Phone call to the patient to return the call 5- and left message to call back at 4:15 pm.   Agustina

## 2024-05-30 ENCOUNTER — TELEPHONE (OUTPATIENT)
Dept: SURGERY | Facility: CLINIC | Age: 54
End: 2024-05-30

## 2024-05-30 NOTE — TELEPHONE ENCOUNTER
Spoke to patient and he is requesting a note to be off work due to pain until follow up with Doctor on June 4th.  [facility] and mailed to [facility].

## 2024-06-04 ENCOUNTER — OFFICE VISIT (OUTPATIENT)
Dept: SURGERY | Facility: CLINIC | Age: 54
End: 2024-06-04

## 2024-06-04 VITALS — WEIGHT: 153 LBS | BODY MASS INDEX: 24.01 KG/M2 | HEIGHT: 67 IN

## 2024-06-04 DIAGNOSIS — Z09 POSTOPERATIVE EXAMINATION: Primary | ICD-10-CM

## 2024-06-04 PROCEDURE — 99024 POSTOP FOLLOW-UP VISIT: CPT | Performed by: SURGERY

## 2024-06-04 NOTE — PROGRESS NOTES
Chief Complaint   Patient presents with    Post-Op     Patient here for post op LIH repair on 5-.  Patient reports he is improving.  Patient would like to return to work 6 wks post op and has physical job.              O:  Ht 5' 7\" (1.702 m)   Wt 153 lb (69.4 kg)   BMI 23.96 kg/m²   GEN:  No acute distress  Abd:  Soft, NTND, left groin incision C/D/I    Path:  Reviewed w pt    Assessment   1. Postoperative examination        Doing well sp an open inguinal hernia repair with mesh.  Continue to avoid heavy lifting for another month.  F/u prn.         Torey Haas MD

## 2024-06-05 ENCOUNTER — TELEPHONE (OUTPATIENT)
Dept: SURGERY | Facility: CLINIC | Age: 54
End: 2024-06-05

## 2024-06-05 NOTE — TELEPHONE ENCOUNTER
Patient called forms office. Patient wanted to know if we had received Short term disability forms for him from [facility]. Informed patient we have not received anything. Patient will call [facility] and have them fax forms to our dept. [phone]. Forms are for surgery he had 05/22/24, patient states he took vacation to cover that time off. Patient states he needs his forms to start 06/03/24-06/21/24 with Return to work 06/24/24.

## 2024-06-06 NOTE — TELEPHONE ENCOUNTER
Patient called to see if our department has received short term disability forms from [employer].    I told him we have not as of yet, although I did provide him our fax number again ([phone]) so he can call and give them our fax number.    Patient expressed understanding, and stated he will give them a call back.

## 2024-06-07 NOTE — TELEPHONE ENCOUNTER
Disability received in forms department. Logged for processing. Valid [facility] Authorization received with forms.

## 2024-06-10 NOTE — TELEPHONE ENCOUNTER
Please try to avoid signing forms in the corner as it is not visible when printing and forms are not accepted this way. Thank you!     Dr. Haas     *The ACKNOWLEDGE button has been moved to the top right ribbon*    Please sign off on form if you agree to: Disability    (place your signature on the first page only)    -From your Inbasket, Highlight the patient and click Chart   -Double click the 6/5/24 Forms Completion telephone encounter  -Scroll down to the Media section   -Click the blue Hyperlink: Disability  Dr. Haas  6/10/24  -Click Acknowledge located in the top right ribbon/menu   -Drag the mouse into the blank space of the document and a + sign will appear. Left click to   electronically sign the document.     Thank you,      Pradeep GALLOWAY

## 2024-06-10 NOTE — TELEPHONE ENCOUNTER
Patient calling to check the status of his forms. Let him know I spoke to rep Fernández who is working on his forms and she will be sending to provider for signature today. Once we receive signature they will be faxed out. Patient expressed understanding.

## 2024-06-11 NOTE — TELEPHONE ENCOUNTER
Disability form completed and signed by provider. Faxed to [facility] [phone]  Confirmation received.

## 2024-06-26 ENCOUNTER — TELEPHONE (OUTPATIENT)
Dept: SURGERY | Facility: CLINIC | Age: 54
End: 2024-06-26

## 2024-06-26 NOTE — TELEPHONE ENCOUNTER
Patient received a letter to be reevaluated for full clearance for work. Patient had surgery on 5/22. Patient need follow up appointment scheduled before July 4 which is when his light work doctor note expires. Please advise with sooner appointment. Next available follow up slot is 7/9

## 2024-06-27 NOTE — TELEPHONE ENCOUNTER
Informed patient that his note states he can return to full duty, he will be at the 6 wk post op.

## 2024-07-01 ENCOUNTER — TELEPHONE (OUTPATIENT)
Dept: SURGERY | Facility: CLINIC | Age: 54
End: 2024-07-01

## 2024-07-01 NOTE — TELEPHONE ENCOUNTER
Patient calling in regards for work. Patient had surgery on 5/22 and is still weak and not 100%. Patient has to return to regular work on Friday. Patient would like to ask provider for a longer little work workers note or if patient has to schedule another appointment. Please advise.

## 2024-07-02 NOTE — TELEPHONE ENCOUNTER
Called patient and scheduled appointment for July 3rd at 2:15pm.  Patient verbalized understanding and has no questions at this time.

## 2024-07-03 ENCOUNTER — OFFICE VISIT (OUTPATIENT)
Dept: SURGERY | Facility: CLINIC | Age: 54
End: 2024-07-03

## 2024-07-03 VITALS — BODY MASS INDEX: 24 KG/M2 | WEIGHT: 153 LBS

## 2024-07-03 DIAGNOSIS — Z48.89 ENCOUNTER FOR POSTOPERATIVE CARE: Primary | ICD-10-CM

## 2024-07-03 PROCEDURE — 99024 POSTOP FOLLOW-UP VISIT: CPT

## 2024-07-03 NOTE — PROGRESS NOTES
S:  Jak Zhou is a 53 year old male. Pt is 6 weeks s/p LIH repair, wanting to follow up to make sure he is okay to return to work. Pt reports some mild suprapubic pain, but ambulating well.     O:  Wt 153 lb (69.4 kg)   BMI 23.96 kg/m²   GEN:  No acute distress  L: nonlabored respirations  H: reg rate  Abd:  Soft, NT,ND.  Skin: Incision C D I, no eryth  Extr: No edema, no calf tenderness      Assessment   1. Encounter for postoperative care        Doing well post op, can return to work with no restrictions since it has been 6 weeks since the operation.  Continue to keep incision clean and dry.  Maintain a healthy diet.  Maintain good hydration.  F/u prn.         Po Mora PA-C

## (undated) DEVICE — SUT PDS II 3-0 27IN SH ABSRB VLT L26MM 1/2

## (undated) DEVICE — 3M™ IOBAN™ 2 ANTIMICROBIAL INCISE DRAPE 6650EZ: Brand: IOBAN™ 2

## (undated) DEVICE — SUT PROL 2-0 30IN SH NABSRB BLU L26MM 1/2 CIR

## (undated) DEVICE — MINOR GENERAL: Brand: MEDLINE INDUSTRIES, INC.

## (undated) DEVICE — DRAPE,ABDOMINAL,MAJOR,STERILE: Brand: MEDLINE

## (undated) DEVICE — VIOLET BRAIDED (POLYGLACTIN 910), SYNTHETIC ABSORBABLE SUTURE: Brand: COATED VICRYL

## (undated) DEVICE — SOLUTION IRRIG 1000ML 0.9% NACL USP BTL

## (undated) DEVICE — SUT MCRYL 4-0 18IN PS-2 ABSRB UD 19MM 3/8 CIR

## (undated) DEVICE — GAMMEX® PI HYBRID SIZE 7.5, STERILE POWDER-FREE SURGICAL GLOVE, POLYISOPRENE AND NEOPRENE BLEND: Brand: GAMMEX

## (undated) DEVICE — ADHESIVE SKIN TOP FOR WND CLSR DERMBND ADV

## (undated) DEVICE — SUT CHRM GUT 2-0 18IN ABSRB UD TIE CLLGN

## (undated) NOTE — LETTER
6/4/2024          To Whom It May Concern:    Jak Zhou is currently under my medical care.  He required surgery on 5-.  He requires medical leave for recovery.  He can return to work on 6- on light duty.  He should avoid lifting, pushing or pulling of 15 lbs or more.  He can return to work full duty on 7-5-2024 with no restrictions.   Please call our office if you have any questions or concerns at [phone].       Sincerely,          Torey Haas MD

## (undated) NOTE — LETTER
5/6/2024          To Whom It May Concern:    Jak Zhou is currently under my medical care and may not return to work at this time.    Please excuse Jak for 1 days.  He may return to work on Tuesday 5/7/24.  Activity is restricted as follows: no lifting over 20 lbs..    If you require additional information please contact our office.        Sincerely,    JOSSE Verma        Document generated by:  JOSSE Verma

## (undated) NOTE — LETTER
6/4/2024          To Whom It May Concern:    Jak Zhou is currently under my medical care and had surgery on 5-.  He requires six weeks of medical leave for complete recovery.  He can return to work full duty with no restrictions on   7-5-2024.      If you require additional information please contact our office.      Sincerely,              Torey Haas MD

## (undated) NOTE — LETTER
April 10, 2019     Chuck Figueroa [address]      Dear Rick Elder:    Below are the results from your recent visit: Tests are all normal. Labs look great. Normal glucose, kidney and liver function.  Normal cholesterol     Resulted Order Eosinophil Absolute 0.08 0.00 - 0.70 x10(3) uL    Basophil Absolute 0.03 0.00 - 0.20 x10(3) uL    Immature Granulocyte Absolute 0.00 0.00 - 1.00 x10(3) uL    Neutrophil % 43.8 %    Lymphocyte % 46.0 %    Monocyte % 7.6 %    Eosinophil % 1.9 %    Basophil

## (undated) NOTE — LETTER
5/14/2024          To Whom It May Concern:    Jak Zhou is currently under my medical care and will be having surgery on 5/22/24. Please allow Jak to remain on light duty until he revaluated after his surgery.     If you require additional information please contact our office.        Sincerely,    Torey Haas MD          Document generated by:  Alana SIMMONS RN

## (undated) NOTE — LETTER
April 13, 2019     Boom Nguyễn      Dear Jose Johnson:    Below are the results from your recent visit: EKG normal other than the bradycardia which we discussed due to running regularly     Resulted Orders   EKG 12-LEAD